# Patient Record
Sex: FEMALE | Race: WHITE | NOT HISPANIC OR LATINO | Employment: OTHER | URBAN - METROPOLITAN AREA
[De-identification: names, ages, dates, MRNs, and addresses within clinical notes are randomized per-mention and may not be internally consistent; named-entity substitution may affect disease eponyms.]

---

## 2018-12-03 ENCOUNTER — NEW PATIENT COMPREHENSIVE (OUTPATIENT)
Dept: URBAN - METROPOLITAN AREA CLINIC 43 | Facility: CLINIC | Age: 78
End: 2018-12-03

## 2018-12-03 DIAGNOSIS — Z96.1: ICD-10-CM

## 2018-12-03 DIAGNOSIS — Z83.518: ICD-10-CM

## 2018-12-03 DIAGNOSIS — H04.123: ICD-10-CM

## 2018-12-03 PROCEDURE — 92133 CPTRZD OPH DX IMG PST SGM ON: CPT

## 2018-12-03 PROCEDURE — 1036F TOBACCO NON-USER: CPT

## 2018-12-03 PROCEDURE — 92015 DETERMINE REFRACTIVE STATE: CPT

## 2018-12-03 PROCEDURE — G8428 CUR MEDS NOT DOCUMENT: HCPCS

## 2018-12-03 PROCEDURE — 92004 COMPRE OPH EXAM NEW PT 1/>: CPT

## 2018-12-03 PROCEDURE — G8785 BP SCRN NO PERF AT INTERVAL: HCPCS

## 2018-12-03 PROCEDURE — G9903 PT SCRN TBCO ID AS NON USER: HCPCS

## 2018-12-03 ASSESSMENT — TONOMETRY
OD_IOP_MMHG: 24
OS_IOP_MMHG: 22

## 2018-12-03 ASSESSMENT — VISUAL ACUITY
OS_SC: J1
OD_SC: J2
OD_SC: 20/50-1
OS_SC: 20/40+2

## 2019-03-14 ENCOUNTER — IOP CHECK (OUTPATIENT)
Dept: URBAN - METROPOLITAN AREA CLINIC 43 | Facility: CLINIC | Age: 79
End: 2019-03-14

## 2019-03-14 DIAGNOSIS — Z96.1: ICD-10-CM

## 2019-03-14 DIAGNOSIS — H04.123: ICD-10-CM

## 2019-03-14 PROCEDURE — 92012 INTRM OPH EXAM EST PATIENT: CPT

## 2019-03-14 ASSESSMENT — VISUAL ACUITY
OD_SC: 20/30-2
OS_SC: 20/40+2

## 2019-03-14 ASSESSMENT — TONOMETRY
OD_IOP_MMHG: 21.5
OS_IOP_MMHG: 18

## 2019-12-17 ENCOUNTER — ESTABLISHED COMPREHENSIVE EXAM (OUTPATIENT)
Dept: URBAN - METROPOLITAN AREA CLINIC 43 | Facility: CLINIC | Age: 79
End: 2019-12-17

## 2019-12-17 DIAGNOSIS — H40.013: ICD-10-CM

## 2019-12-17 DIAGNOSIS — Z98.890: ICD-10-CM

## 2019-12-17 DIAGNOSIS — Z83.518: ICD-10-CM

## 2019-12-17 DIAGNOSIS — H35.30: ICD-10-CM

## 2019-12-17 DIAGNOSIS — H04.123: ICD-10-CM

## 2019-12-17 DIAGNOSIS — Z96.1: ICD-10-CM

## 2019-12-17 PROCEDURE — 92014 COMPRE OPH EXAM EST PT 1/>: CPT

## 2019-12-17 PROCEDURE — 92133 CPTRZD OPH DX IMG PST SGM ON: CPT

## 2019-12-17 PROCEDURE — 92015 DETERMINE REFRACTIVE STATE: CPT

## 2019-12-17 ASSESSMENT — TONOMETRY
OS_IOP_MMHG: 19
OD_IOP_MMHG: 25

## 2019-12-17 ASSESSMENT — VISUAL ACUITY
OS_SC: 20/30-1
OD_SC: J1
OD_SC: 20/30-1+2
OS_SC: J1

## 2020-01-21 ENCOUNTER — ESTABLISHED COMPREHENSIVE EXAM (OUTPATIENT)
Dept: URBAN - METROPOLITAN AREA CLINIC 43 | Facility: CLINIC | Age: 80
End: 2020-01-21

## 2020-01-21 DIAGNOSIS — H04.123: ICD-10-CM

## 2020-01-21 DIAGNOSIS — H40.013: ICD-10-CM

## 2020-01-21 PROCEDURE — 92012 INTRM OPH EXAM EST PATIENT: CPT

## 2020-01-21 PROCEDURE — 92083 EXTENDED VISUAL FIELD XM: CPT

## 2020-01-21 PROCEDURE — 92020 GONIOSCOPY: CPT

## 2020-01-21 ASSESSMENT — VISUAL ACUITY
OS_SC: 20/30-2
OD_SC: 20/40-1

## 2020-01-21 ASSESSMENT — TONOMETRY
OS_IOP_MMHG: 21
OD_IOP_MMHG: 25

## 2020-02-28 NOTE — PATIENT DISCUSSION
CHALAZION RLL: PRESCRIBED WARM COMPRESSES, EYELID SCRUBS AND DOXYCYCLINE 100MG BID PO X 2 WEEKS AND TOBRADEX RAGINI QHS X 2 WEEKS.  I&amp;D TODAY

## 2020-03-12 ENCOUNTER — CONSULT (OUTPATIENT)
Dept: URBAN - METROPOLITAN AREA CLINIC 43 | Facility: CLINIC | Age: 80
End: 2020-03-12

## 2020-03-12 DIAGNOSIS — H40.1131: ICD-10-CM

## 2020-03-12 DIAGNOSIS — Z96.1: ICD-10-CM

## 2020-03-12 DIAGNOSIS — H04.123: ICD-10-CM

## 2020-03-12 PROCEDURE — 92014 COMPRE OPH EXAM EST PT 1/>: CPT

## 2020-03-12 PROCEDURE — 6585550 LASER TRABECULOPLASTY

## 2020-03-12 PROCEDURE — 92020 GONIOSCOPY: CPT

## 2020-03-12 PROCEDURE — 92250 FUNDUS PHOTOGRAPHY W/I&R: CPT

## 2020-03-12 PROCEDURE — 76514 ECHO EXAM OF EYE THICKNESS: CPT

## 2020-03-12 RX ORDER — PREDNISOLONE ACETATE 10 MG/ML: 1 SUSPENSION/ DROPS OPHTHALMIC

## 2020-03-12 ASSESSMENT — VISUAL ACUITY
OS_SC: 20/25-1
OD_SC: 20/25
OS_SC: J1
OD_SC: J1

## 2020-03-12 ASSESSMENT — TONOMETRY
OD_IOP_MMHG: 23
OS_IOP_MMHG: 21

## 2020-03-12 ASSESSMENT — PACHYMETRY
OD_CT_UM: 574
OS_CT_UM: 567

## 2020-03-17 NOTE — PATIENT DISCUSSION
New Prescription: Augmentin (amoxicillin-pot clavulanate): tablet: 875-125 mg 1 tablet three times a day as directed by mouth 03-

## 2020-03-17 NOTE — PATIENT DISCUSSION
CHALAZION RLL: PRESCRIBED WARM COMPRESSES, EYELID SCRUBS AND AUGMENTIN TID FOR 1 WEEK AND TOBRADEX RAGINI QHS X 2 WEEKS.  KENALOG TODAY

## 2020-03-17 NOTE — PATIENT DISCUSSION
Chalazion Counseling: I explained to the patient that a chalazion is an inflammatory reaction to trapped oil secretions in the eyelid. I also explained that while chalazions usually respond well to treatment, some people are prone to recurrences of them. The options for medical versus surgical treatment as well as the risks, benefits and alternatives for each were reviewed with the patient. The patient understands and desires to proceed with incision and drainage.

## 2020-03-17 NOTE — PATIENT DISCUSSION
Continue: TobraDex (tobramycin-dexamethasone): ointment: 0.3-0.1% a small amount every night into affected eye 02-

## 2020-04-14 ENCOUNTER — IOP CHECK (OUTPATIENT)
Dept: URBAN - METROPOLITAN AREA CLINIC 43 | Facility: CLINIC | Age: 80
End: 2020-04-14

## 2020-04-14 DIAGNOSIS — H40.1131: ICD-10-CM

## 2020-04-14 DIAGNOSIS — H04.123: ICD-10-CM

## 2020-04-14 PROCEDURE — 92012 INTRM OPH EXAM EST PATIENT: CPT

## 2020-04-14 ASSESSMENT — VISUAL ACUITY
OD_SC: 20/30
OS_SC: 20/30

## 2020-04-14 ASSESSMENT — TONOMETRY
OS_IOP_MMHG: 14
OD_IOP_MMHG: 14

## 2020-10-19 ENCOUNTER — ESTABLISHED COMPREHENSIVE EXAM (OUTPATIENT)
Dept: URBAN - METROPOLITAN AREA CLINIC 43 | Facility: CLINIC | Age: 80
End: 2020-10-19

## 2020-10-19 DIAGNOSIS — Z96.1: ICD-10-CM

## 2020-10-19 DIAGNOSIS — Z83.518: ICD-10-CM

## 2020-10-19 DIAGNOSIS — H35.30: ICD-10-CM

## 2020-10-19 DIAGNOSIS — H40.1131: ICD-10-CM

## 2020-10-19 DIAGNOSIS — H04.123: ICD-10-CM

## 2020-10-19 PROCEDURE — 92015 DETERMINE REFRACTIVE STATE: CPT

## 2020-10-19 PROCEDURE — 92133 CPTRZD OPH DX IMG PST SGM ON: CPT

## 2020-10-19 PROCEDURE — 92014 COMPRE OPH EXAM EST PT 1/>: CPT

## 2020-10-19 ASSESSMENT — VISUAL ACUITY
OD_SC: J1
OS_SC: J1
OD_SC: 20/40-1
OS_SC: 20/30-1

## 2020-10-19 ASSESSMENT — TONOMETRY
OS_IOP_MMHG: 17
OD_IOP_MMHG: 17

## 2021-04-30 ENCOUNTER — IOP CHECK (OUTPATIENT)
Dept: URBAN - METROPOLITAN AREA CLINIC 43 | Facility: CLINIC | Age: 81
End: 2021-04-30

## 2021-04-30 DIAGNOSIS — Z98.890: ICD-10-CM

## 2021-04-30 DIAGNOSIS — H40.1131: ICD-10-CM

## 2021-04-30 PROCEDURE — 92012 INTRM OPH EXAM EST PATIENT: CPT

## 2021-04-30 PROCEDURE — 92250 FUNDUS PHOTOGRAPHY W/I&R: CPT

## 2021-04-30 PROCEDURE — 92083 EXTENDED VISUAL FIELD XM: CPT

## 2021-04-30 ASSESSMENT — VISUAL ACUITY
OS_SC: 20/40-1
OD_SC: 20/40+2

## 2021-04-30 ASSESSMENT — TONOMETRY
OD_IOP_MMHG: 18
OS_IOP_MMHG: 16

## 2021-10-26 ENCOUNTER — ESTABLISHED COMPREHENSIVE EXAM (OUTPATIENT)
Dept: URBAN - METROPOLITAN AREA CLINIC 43 | Facility: CLINIC | Age: 81
End: 2021-10-26

## 2021-10-26 DIAGNOSIS — Z96.1: ICD-10-CM

## 2021-10-26 DIAGNOSIS — H04.123: ICD-10-CM

## 2021-10-26 DIAGNOSIS — H40.1131: ICD-10-CM

## 2021-10-26 DIAGNOSIS — Z98.890: ICD-10-CM

## 2021-10-26 PROCEDURE — 92015 DETERMINE REFRACTIVE STATE: CPT

## 2021-10-26 PROCEDURE — 92014 COMPRE OPH EXAM EST PT 1/>: CPT

## 2021-10-26 PROCEDURE — 92133 CPTRZD OPH DX IMG PST SGM ON: CPT

## 2021-10-26 ASSESSMENT — VISUAL ACUITY
OS_SC: 20/30+2
OD_SC: 20/25-2
OD_SC: J1
OS_SC: J1

## 2021-10-26 ASSESSMENT — TONOMETRY
OS_IOP_MMHG: 19
OD_IOP_MMHG: 20

## 2022-04-19 ENCOUNTER — ESTABLISHED PATIENT (OUTPATIENT)
Dept: URBAN - METROPOLITAN AREA CLINIC 43 | Facility: CLINIC | Age: 82
End: 2022-04-19

## 2022-04-19 DIAGNOSIS — Z98.890: ICD-10-CM

## 2022-04-19 DIAGNOSIS — H35.30: ICD-10-CM

## 2022-04-19 DIAGNOSIS — H04.123: ICD-10-CM

## 2022-04-19 DIAGNOSIS — H40.1131: ICD-10-CM

## 2022-04-19 PROCEDURE — 92083 EXTENDED VISUAL FIELD XM: CPT

## 2022-04-19 PROCEDURE — 92012 INTRM OPH EXAM EST PATIENT: CPT

## 2022-04-19 ASSESSMENT — VISUAL ACUITY
OS_SC: 20/30
OD_SC: 20/40+1

## 2022-04-19 ASSESSMENT — TONOMETRY
OD_IOP_MMHG: 14
OS_IOP_MMHG: 11

## 2022-10-20 ENCOUNTER — COMPREHENSIVE EXAM (OUTPATIENT)
Dept: URBAN - METROPOLITAN AREA CLINIC 43 | Facility: CLINIC | Age: 82
End: 2022-10-20

## 2022-10-20 DIAGNOSIS — H35.30: ICD-10-CM

## 2022-10-20 DIAGNOSIS — H40.1131: ICD-10-CM

## 2022-10-20 DIAGNOSIS — Z98.890: ICD-10-CM

## 2022-10-20 DIAGNOSIS — H04.123: ICD-10-CM

## 2022-10-20 DIAGNOSIS — Z96.1: ICD-10-CM

## 2022-10-20 PROCEDURE — 92014 COMPRE OPH EXAM EST PT 1/>: CPT

## 2022-10-20 PROCEDURE — 92015 DETERMINE REFRACTIVE STATE: CPT

## 2022-10-20 ASSESSMENT — TONOMETRY
OS_IOP_MMHG: 14
OD_IOP_MMHG: 14

## 2022-10-20 ASSESSMENT — VISUAL ACUITY
OD_SC: 20/20-2
OU_SC: 20/25-2
OS_SC: J1+
OD_SC: J1+
OU_SC: J1+
OS_SC: 20/30-1

## 2023-04-12 ENCOUNTER — FOLLOW UP (OUTPATIENT)
Dept: URBAN - METROPOLITAN AREA CLINIC 43 | Facility: CLINIC | Age: 83
End: 2023-04-12

## 2023-04-12 DIAGNOSIS — H40.1131: ICD-10-CM

## 2023-04-12 DIAGNOSIS — H35.30: ICD-10-CM

## 2023-04-12 DIAGNOSIS — Z98.890: ICD-10-CM

## 2023-04-12 DIAGNOSIS — H04.123: ICD-10-CM

## 2023-04-12 PROCEDURE — 92083 EXTENDED VISUAL FIELD XM: CPT

## 2023-04-12 PROCEDURE — 92012 INTRM OPH EXAM EST PATIENT: CPT

## 2023-04-12 ASSESSMENT — VISUAL ACUITY
OS_SC: 20/25
OD_SC: 20/40

## 2023-04-12 ASSESSMENT — TONOMETRY
OD_IOP_MMHG: 17
OS_IOP_MMHG: 15

## 2023-10-03 ENCOUNTER — COMPREHENSIVE EXAM (OUTPATIENT)
Dept: URBAN - METROPOLITAN AREA CLINIC 43 | Facility: CLINIC | Age: 83
End: 2023-10-03

## 2023-10-03 DIAGNOSIS — Z96.1: ICD-10-CM

## 2023-10-03 DIAGNOSIS — H04.123: ICD-10-CM

## 2023-10-03 DIAGNOSIS — H40.1131: ICD-10-CM

## 2023-10-03 DIAGNOSIS — H35.30: ICD-10-CM

## 2023-10-03 DIAGNOSIS — Z98.890: ICD-10-CM

## 2023-10-03 PROCEDURE — 92014 COMPRE OPH EXAM EST PT 1/>: CPT

## 2023-10-03 PROCEDURE — 92133 CPTRZD OPH DX IMG PST SGM ON: CPT

## 2023-10-03 ASSESSMENT — TONOMETRY
OS_IOP_MMHG: 15
OD_IOP_MMHG: 15

## 2023-10-03 ASSESSMENT — VISUAL ACUITY
OS_SC: 20/20
OD_SC: J1
OD_SC: 20/25
OU_SC: 20/20
OS_SC: J1
OU_SC: J1

## 2024-04-09 ENCOUNTER — FOLLOW UP (OUTPATIENT)
Dept: URBAN - METROPOLITAN AREA CLINIC 43 | Facility: CLINIC | Age: 84
End: 2024-04-09

## 2024-04-09 DIAGNOSIS — Z98.890: ICD-10-CM

## 2024-04-09 DIAGNOSIS — H04.123: ICD-10-CM

## 2024-04-09 DIAGNOSIS — Z96.1: ICD-10-CM

## 2024-04-09 DIAGNOSIS — H40.1131: ICD-10-CM

## 2024-04-09 DIAGNOSIS — H35.30: ICD-10-CM

## 2024-04-09 PROCEDURE — 92083 EXTENDED VISUAL FIELD XM: CPT

## 2024-04-09 PROCEDURE — 92012 INTRM OPH EXAM EST PATIENT: CPT

## 2024-04-09 ASSESSMENT — VISUAL ACUITY
OD_SC: 20/40-2
OS_SC: 20/30+2

## 2024-04-09 ASSESSMENT — TONOMETRY
OS_IOP_MMHG: 16
OD_IOP_MMHG: 16

## 2024-04-25 ENCOUNTER — CONSULTATION/EVALUATION (OUTPATIENT)
Dept: URBAN - METROPOLITAN AREA CLINIC 43 | Facility: CLINIC | Age: 84
End: 2024-04-25

## 2024-04-25 DIAGNOSIS — H04.123: ICD-10-CM

## 2024-04-25 DIAGNOSIS — H35.30: ICD-10-CM

## 2024-04-25 DIAGNOSIS — H40.1132: ICD-10-CM

## 2024-04-25 DIAGNOSIS — Z96.1: ICD-10-CM

## 2024-04-25 PROCEDURE — 92020 GONIOSCOPY: CPT

## 2024-04-25 PROCEDURE — 92250 FUNDUS PHOTOGRAPHY W/I&R: CPT

## 2024-04-25 PROCEDURE — 65855 TRABECULOPLASTY LASER SURG: CPT

## 2024-04-25 PROCEDURE — 92014 COMPRE OPH EXAM EST PT 1/>: CPT

## 2024-04-25 RX ORDER — PREDNISOLONE ACETATE 10 MG/ML: 1 SUSPENSION/ DROPS OPHTHALMIC

## 2024-04-25 ASSESSMENT — VISUAL ACUITY
OD_SC: J2
OD_SC: 20/30
OS_SC: J1
OS_SC: 20/30-1

## 2024-04-25 ASSESSMENT — TONOMETRY
OD_IOP_MMHG: 17
OS_IOP_MMHG: 15

## 2024-05-30 ENCOUNTER — POST-OP (OUTPATIENT)
Dept: URBAN - METROPOLITAN AREA CLINIC 43 | Facility: CLINIC | Age: 84
End: 2024-05-30

## 2024-05-30 DIAGNOSIS — H40.1132: ICD-10-CM

## 2024-05-30 DIAGNOSIS — H04.123: ICD-10-CM

## 2024-05-30 PROCEDURE — 99024 POSTOP FOLLOW-UP VISIT: CPT

## 2024-05-30 ASSESSMENT — VISUAL ACUITY
OD_SC: 20/25-2
OS_SC: 20/25-1

## 2024-05-30 ASSESSMENT — TONOMETRY
OS_IOP_MMHG: 17
OD_IOP_MMHG: 16

## 2024-10-28 ENCOUNTER — COMPREHENSIVE EXAM (OUTPATIENT)
Dept: URBAN - METROPOLITAN AREA CLINIC 43 | Facility: CLINIC | Age: 84
End: 2024-10-28

## 2024-10-28 DIAGNOSIS — H04.123: ICD-10-CM

## 2024-10-28 DIAGNOSIS — Z96.1: ICD-10-CM

## 2024-10-28 DIAGNOSIS — H35.30: ICD-10-CM

## 2024-10-28 DIAGNOSIS — H40.1132: ICD-10-CM

## 2024-10-28 PROCEDURE — 92014 COMPRE OPH EXAM EST PT 1/>: CPT

## 2024-10-28 PROCEDURE — 92133 CPTRZD OPH DX IMG PST SGM ON: CPT

## 2024-10-28 PROCEDURE — 92015 DETERMINE REFRACTIVE STATE: CPT

## 2025-04-29 ENCOUNTER — FOLLOW UP (OUTPATIENT)
Age: 85
End: 2025-04-29

## 2025-04-29 DIAGNOSIS — H40.1132: ICD-10-CM

## 2025-04-29 DIAGNOSIS — H35.30: ICD-10-CM

## 2025-04-29 DIAGNOSIS — H04.123: ICD-10-CM

## 2025-04-29 PROCEDURE — 92083 EXTENDED VISUAL FIELD XM: CPT

## 2025-04-29 PROCEDURE — 1036F TOBACCO NON-USER: CPT

## 2025-04-29 PROCEDURE — 92012 INTRM OPH EXAM EST PATIENT: CPT

## 2025-04-29 PROCEDURE — G8785 BP SCRN NO PERF AT INTERVAL: HCPCS
